# Patient Record
Sex: MALE | Race: WHITE | NOT HISPANIC OR LATINO | Employment: FULL TIME | URBAN - METROPOLITAN AREA
[De-identification: names, ages, dates, MRNs, and addresses within clinical notes are randomized per-mention and may not be internally consistent; named-entity substitution may affect disease eponyms.]

---

## 2019-08-15 ENCOUNTER — CLINICAL SUPPORT (OUTPATIENT)
Dept: URGENT CARE | Facility: CLINIC | Age: 59
End: 2019-08-15
Payer: COMMERCIAL

## 2019-08-15 ENCOUNTER — TRANSCRIBE ORDERS (OUTPATIENT)
Dept: ADMINISTRATIVE | Facility: HOSPITAL | Age: 59
End: 2019-08-15

## 2019-08-15 ENCOUNTER — APPOINTMENT (OUTPATIENT)
Dept: RADIOLOGY | Facility: CLINIC | Age: 59
End: 2019-08-15
Payer: COMMERCIAL

## 2019-08-15 ENCOUNTER — APPOINTMENT (OUTPATIENT)
Dept: LAB | Facility: CLINIC | Age: 59
End: 2019-08-15
Payer: COMMERCIAL

## 2019-08-15 ENCOUNTER — APPOINTMENT (OUTPATIENT)
Dept: URGENT CARE | Facility: CLINIC | Age: 59
End: 2019-08-15
Payer: COMMERCIAL

## 2019-08-15 DIAGNOSIS — Z00.00 ROUTINE MEDICAL EXAM: ICD-10-CM

## 2019-08-15 DIAGNOSIS — Z00.00 ROUTINE MEDICAL EXAM: Primary | ICD-10-CM

## 2019-08-15 LAB
25(OH)D3 SERPL-MCNC: 31.4 NG/ML (ref 30–100)
ALBUMIN SERPL BCP-MCNC: 4.3 G/DL (ref 3.5–5)
ALP SERPL-CCNC: 71 U/L (ref 46–116)
ALT SERPL W P-5'-P-CCNC: 30 U/L (ref 12–78)
ANION GAP SERPL CALCULATED.3IONS-SCNC: 6 MMOL/L (ref 4–13)
AST SERPL W P-5'-P-CCNC: 15 U/L (ref 5–45)
BILIRUB DIRECT SERPL-MCNC: 0.16 MG/DL (ref 0–0.2)
BILIRUB SERPL-MCNC: 0.69 MG/DL (ref 0.2–1)
BUN SERPL-MCNC: 12 MG/DL (ref 5–25)
CALCIUM SERPL-MCNC: 9.3 MG/DL (ref 8.3–10.1)
CHLORIDE SERPL-SCNC: 106 MMOL/L (ref 100–108)
CHOLEST SERPL-MCNC: 223 MG/DL (ref 50–200)
CO2 SERPL-SCNC: 26 MMOL/L (ref 21–32)
CREAT SERPL-MCNC: 0.92 MG/DL (ref 0.6–1.3)
ERYTHROCYTE [DISTWIDTH] IN BLOOD BY AUTOMATED COUNT: 13.6 % (ref 11.6–15.1)
GFR SERPL CREATININE-BSD FRML MDRD: 89 ML/MIN/1.73SQ M
GLUCOSE P FAST SERPL-MCNC: 91 MG/DL (ref 65–99)
HCT VFR BLD AUTO: 48.4 % (ref 36.5–49.3)
HDLC SERPL-MCNC: 51 MG/DL (ref 40–60)
HGB BLD-MCNC: 16.7 G/DL (ref 12–17)
LDLC SERPL CALC-MCNC: 150 MG/DL (ref 0–100)
MCH RBC QN AUTO: 31.2 PG (ref 26.8–34.3)
MCHC RBC AUTO-ENTMCNC: 34.5 G/DL (ref 31.4–37.4)
MCV RBC AUTO: 91 FL (ref 82–98)
NONHDLC SERPL-MCNC: 172 MG/DL
PLATELET # BLD AUTO: 268 THOUSANDS/UL (ref 149–390)
PMV BLD AUTO: 9 FL (ref 8.9–12.7)
POTASSIUM SERPL-SCNC: 3.8 MMOL/L (ref 3.5–5.3)
PROT SERPL-MCNC: 7.6 G/DL (ref 6.4–8.2)
RBC # BLD AUTO: 5.35 MILLION/UL (ref 3.88–5.62)
SODIUM SERPL-SCNC: 138 MMOL/L (ref 136–145)
TRIGL SERPL-MCNC: 111 MG/DL
WBC # BLD AUTO: 5.85 THOUSAND/UL (ref 4.31–10.16)

## 2019-08-15 PROCEDURE — 36415 COLL VENOUS BLD VENIPUNCTURE: CPT

## 2019-08-15 PROCEDURE — 80053 COMPREHEN METABOLIC PANEL: CPT

## 2019-08-15 PROCEDURE — 82306 VITAMIN D 25 HYDROXY: CPT

## 2019-08-15 PROCEDURE — 85027 COMPLETE CBC AUTOMATED: CPT

## 2019-08-15 PROCEDURE — 80061 LIPID PANEL: CPT

## 2019-08-15 PROCEDURE — 93005 ELECTROCARDIOGRAM TRACING: CPT

## 2019-08-15 PROCEDURE — 71046 X-RAY EXAM CHEST 2 VIEWS: CPT

## 2019-08-15 PROCEDURE — 82248 BILIRUBIN DIRECT: CPT

## 2019-08-20 LAB
ATRIAL RATE: 76 BPM
P AXIS: 30 DEGREES
PR INTERVAL: 154 MS
QRS AXIS: 23 DEGREES
QRSD INTERVAL: 80 MS
QT INTERVAL: 368 MS
QTC INTERVAL: 414 MS
T WAVE AXIS: 33 DEGREES
VENTRICULAR RATE: 76 BPM

## 2019-08-20 PROCEDURE — 93010 ELECTROCARDIOGRAM REPORT: CPT | Performed by: INTERNAL MEDICINE

## 2024-10-29 ENCOUNTER — OFFICE VISIT (OUTPATIENT)
Dept: URGENT CARE | Facility: CLINIC | Age: 64
End: 2024-10-29
Payer: COMMERCIAL

## 2024-10-29 VITALS
BODY MASS INDEX: 29.19 KG/M2 | TEMPERATURE: 97.8 F | DIASTOLIC BLOOD PRESSURE: 70 MMHG | HEART RATE: 82 BPM | OXYGEN SATURATION: 98 % | WEIGHT: 186 LBS | SYSTOLIC BLOOD PRESSURE: 109 MMHG | RESPIRATION RATE: 18 BRPM | HEIGHT: 67 IN

## 2024-10-29 DIAGNOSIS — M79.645 PAIN OF LEFT THUMB: Primary | ICD-10-CM

## 2024-10-29 PROBLEM — E78.5 HYPERLIPIDEMIA, UNSPECIFIED: Status: ACTIVE | Noted: 2024-10-29

## 2024-10-29 PROBLEM — J30.9 ALLERGIC RHINITIS: Status: ACTIVE | Noted: 2024-10-29

## 2024-10-29 PROBLEM — E53.8 COBALAMIN DEFICIENCY: Status: ACTIVE | Noted: 2024-10-29

## 2024-10-29 PROBLEM — F41.9 ANXIETY: Status: ACTIVE | Noted: 2024-10-29

## 2024-10-29 PROBLEM — R35.1 NOCTURIA ASSOCIATED WITH BENIGN PROSTATIC HYPERPLASIA: Status: ACTIVE | Noted: 2024-10-29

## 2024-10-29 PROBLEM — N40.1 NOCTURIA ASSOCIATED WITH BENIGN PROSTATIC HYPERPLASIA: Status: ACTIVE | Noted: 2024-10-29

## 2024-10-29 PROCEDURE — 99204 OFFICE O/P NEW MOD 45 MIN: CPT | Performed by: NURSE PRACTITIONER

## 2024-10-29 RX ORDER — IBUPROFEN AND FAMOTIDINE 26.6; 8 MG/1; MG/1
1 TABLET ORAL 3 TIMES DAILY PRN
Qty: 90 TABLET | Refills: 0 | Status: SHIPPED | OUTPATIENT
Start: 2024-10-29 | End: 2024-10-29 | Stop reason: ALTCHOICE

## 2024-10-29 RX ORDER — PREDNISONE 10 MG/1
TABLET ORAL
Qty: 30 TABLET | Refills: 0 | Status: SHIPPED | OUTPATIENT
Start: 2024-10-29

## 2024-10-29 RX ORDER — MELOXICAM 15 MG/1
15 TABLET ORAL DAILY
Qty: 30 TABLET | Refills: 0 | Status: SHIPPED | OUTPATIENT
Start: 2024-10-29

## 2024-10-29 RX ORDER — OMEPRAZOLE 40 MG/1
1 CAPSULE, DELAYED RELEASE ORAL DAILY
COMMUNITY
Start: 2024-10-19

## 2024-10-29 NOTE — PROGRESS NOTES
"  [facility]        NAME: Dee Eden is a 64 y.o. male  : 1960    MRN: [id number]  DATE: 2024  TIME: 11:44 AM      Assessment and Plan     Pain of left thumb [M79.645]  1. Pain of left thumb  Ambulatory Referral to Orthopedic Surgery    predniSONE 10 mg tablet    meloxicam (Mobic) 15 mg tablet    Ambulatory Referral to Family Practice    DISCONTINUED: Ibuprofen-Famotidine 800-26.6 MG TABS        Thumb splint offered explaining that immobilizing the thumb temporarily would help decrease inflammation.  Patient politely declines at this time.    Patient Instructions     Patient Instructions   Patient Education     Osteoarthritis   The Basics   Written by the doctors and editors at [employer]To[employer]   What is osteoarthritis? -- \"Arthritis\" is a general term that means inflammation of the joints. There are dozens of types of arthritis. Osteoarthritis is the most common type. It often begins later in life, and it often affects the hands, knees, and hips.  The place where 2 bones meet is normally covered with a rubbery material called cartilage. This material allows the bones to slide over each other without causing pain. When osteoarthritis happens, the cartilage begins to break down. As it wears away, the bones in the joint start to rub against each other (figure 1). This can cause pain, stiffness, and swelling (table 1).  What can I do to feel better? -- To ease your symptoms, you can:   Rest for several minutes when your pain is really bad - But don't rest too long. That can make your muscles weak and make your pain worse.   Try losing weight, if needed - Carrying excess weight puts extra strain on your joints. Your doctor or nurse can talk to you about healthy ways to lose weight.   Get plenty of physical activity - Having strong muscles takes some of the strain off of your joints. It can reduce your pain later, even if it hurts to do at first. There are lots of different ways to get physical " "activity. Even gentle forms of exercise, like walking, are good for your health. Your doctor or nurse can help you come up with a routine that works for you. They might also suggest that you work with a physical therapist (exercise expert).   Use \"assistive devices\" if your doctor recommends them - These devices can help keep your joints stable or take weight off of them. Examples include shoe inserts, splints, canes, and walkers.   Use hot or cold packs - Some people find that heat or cold helps relieves pain for a short time.   Learn about arthritis - Learning about your condition allows you to work with your doctor or nurse to find the things that help you best. It can also help you better understand what to expect with your symptoms and treatment.  Can herbs, vitamins, or supplements help? -- There is no strong evidence that supplements of any sort work on arthritis symptoms. That's true even for glucosamine and chondroitin, which are supplements that some people think help with arthritis. If you want to try any supplements or herbs, check with your doctor or nurse before taking them.  Are there medicines I can take? -- Usually, doctors suggest trying things like physical activity, weight loss, and assistive devices first. If these do not help with pain, they might recommend medicine. Options include medicines that come as pills as well as creams and gels that go on the skin.  In some situations, doctors might suggest shots that go into the joint to relieve pain. But these are not usually used as a main treatment, because they only work for a few weeks.  What about surgery? -- When other treatments do not help enough, some people with osteoarthritis get surgery. For instance, some people have surgery to replace a knee or a hip. Surgeons are working on other types of surgery for arthritis, too.  How can I find an approach that works for me? -- The symptoms of osteoarthritis can be hard to cope with. But don't " "lose hope. You might need to try different combinations of medicines, exercises, and devices to find the approach that works for you. But most people are able to find ways to go back to doing many of the things that they like to do.  All topics are updated as new evidence becomes available and our peer review process is complete.  This topic retrieved from [employer] on: Feb 26, 2024.  Topic 99087 Version 21.0  Release: 32.2.4 - C32.56  © 2024 UpToDate, Inc. and/or its affiliates. All rights reserved.  figure 1: Knee osteoarthritis     This drawing shows a normal knee joint next to a knee joint with osteoarthritis. In the osteoarthritis joint, the cartilage covering the ends of the bones roughens and becomes thin, while the bone underneath the cartilage grows thicker. Bony growths called \"osteophytes\" can form. The space between the bones also becomes narrower.  Graphic 267612 Version 3.0  table 1: Effects of osteoarthritis  Age when symptoms start    Usually after 40   Commonly affected body parts    Neck and lower back   Joint at the base of the thumb   Knuckles in the middle and near the tip of the fingers   Hip   Knee   Ankle joint   Knuckle at the base of the big toe   Less commonly affected body parts    Shoulder   Wrist   Elbow   Knuckles at the base of the fingers   Symptoms    Pain   Stiffness   Crackling or clicking sounds in the joints   Extra bone growth (for example, knuckles that look swollen or knobby)   Decreased range of motion   Problems with the alignment of certain joints   Tenderness to the touch   Graphic 41598 Version 2.0  Consumer Information Use and Disclaimer   Disclaimer: This generalized information is a limited summary of diagnosis, treatment, and/or medication information. It is not meant to be comprehensive and should be used as a tool to help the user understand and/or assess potential diagnostic and treatment options. It does NOT include all information about conditions, treatments, " medications, side effects, or risks that may apply to a specific patient. It is not intended to be medical advice or a substitute for the medical advice, diagnosis, or treatment of a health care provider based on the health care provider's examination and assessment of a patient's specific and unique circumstances. Patients must speak with a health care provider for complete information about their health, medical questions, and treatment options, including any risks or benefits regarding use of medications. This information does not endorse any treatments or medications as safe, effective, or approved for treating a specific patient. UpToDate, Inc. and its affiliates disclaim any warranty or liability relating to this information or the use thereof.The use of this information is governed by the Terms of Use, available at https://www.[employer]/en/know/clinical-effectiveness-terms. 2024© UpToDate, Inc. and its affiliates and/or licensors. All rights reserved.  Copyright   © 2024 UpToDate, Inc. and/or its affiliates. All rights reserved.     Follow up with PCP in 3-5 days.  Proceed to  ER if symptoms worsen.    Chief Complaint     Chief Complaint   Patient presents with    Wrist Pain     L wrist pain x 3 years, more acute x 2 weeks. Previous treatment by PCP for arthritis.         History of Present Illness     Patient presents with acute on chronic pain of left hand/wrist.  He isolates the pain to the base of his left thumb but notes that will radiate up his arm at times.  At times the thumb will go numb.  He is predominantly right hand dominant.  No specific injury.  Denies any increased use.  He was seen previously for this issue several visits between 2020 and 2022 at [facility].  Initially he was prescribed Voltaren gel several times which helped slightly but not much.  He was then prescribed ibuprofen 800 3 times daily as needed along with Pepcid 20 twice daily.  He remembers this helping some but again  not significantly.  He was prescribed Mobic 15 daily.  He notes that he only took this occasionally not frequently.  We did discuss that anti-inflammatories have a cumulative effect when they are used consistently for 3 to 7 days.  Ultimately on September 27, 2022 he received lido with Depo-Medrol injection into the joint which resolved the acute symptoms (until now).        Review of Systems     Review of Systems   Musculoskeletal:  Positive for arthralgias and joint swelling.   All other systems reviewed and are negative.        Current Medications       Current Outpatient Medications:     azelastine (ASTELIN) 0.1 % nasal spray, 1 spray into each nostril 2 (two) times a day, Disp: 30 mL, Rfl: 11    ciprofloxacin-dexamethasone (CIPRODEX) otic suspension, INSTILL 4 DROPS INTO THE RIGHT EAR TWICE A DAY, Disp: 7.5 mL, Rfl: 5    meloxicam (Mobic) 15 mg tablet, Take 1 tablet (15 mg total) by mouth daily, Disp: 30 tablet, Rfl: 0    omeprazole (PriLOSEC) 40 MG capsule, Take 1 capsule by mouth in the morning, Disp: , Rfl:     Pancrelipase, Lip-Prot-Amyl, (CREON PO), Take 6,000 Units by mouth 3 (three) times a day, Disp: , Rfl:     predniSONE 10 mg tablet, 5 tabs daily x 2 days, 4 tabs daily x 2 days, 3 tabs daily x 2 days, 2 tabs daily x 2 days, 1 tab daily x 2 days, Disp: 30 tablet, Rfl: 0    Diclofenac Sodium (VOLTAREN) 1 %, Apply 4 g topically 4 (four) times a day as needed (pain), Disp: , Rfl:     Current Allergies     Allergies as of 10/29/2024    (No Known Allergies)              The following portions of the patient's history were reviewed and updated as appropriate: allergies, current medications, past family history, past medical history, past social history, past surgical history and problem list.     Past Medical History:   Diagnosis Date    Chronic mastoiditis, right     ETD (eustachian tube dysfunction)        Past Surgical History:   Procedure Laterality Date    MASTOID SURGERY Right     TONSILLECTOMY    "      Family History   Problem Relation Age of Onset    No Known Problems Mother     No Known Problems Father          Medications have been verified.        Objective     /70 (BP Location: Left arm, Patient Position: Sitting, Cuff Size: Large)   Pulse 82   Temp 97.8 °F (36.6 °C) (Temporal)   Resp 18   Ht 5' 7\" (1.702 m)   Wt 84.4 kg (186 lb)   SpO2 98%   BMI 29.13 kg/m²   No LMP for male patient.         Physical Exam     Physical Exam  Vitals and nursing note reviewed.   Constitutional:       General: He is not in acute distress.     Appearance: Normal appearance. He is well-developed. He is not ill-appearing, toxic-appearing or diaphoretic.   HENT:      Head: Normocephalic and atraumatic.   Pulmonary:      Effort: Pulmonary effort is normal. No respiratory distress.   Abdominal:      General: There is no distension.   Musculoskeletal:         General: Swelling and tenderness present. No deformity or signs of injury. Normal range of motion.      Left wrist: Normal. No tenderness.      Left hand: Swelling, tenderness and bony tenderness present. No deformity or lacerations. Normal range of motion. Normal strength. Normal sensation (not at present time although he will have intermittent numbness). There is no disruption of two-point discrimination. Normal capillary refill. Normal pulse.      Comments: Point tenderness at 1st MCP left hand with mild localized swelling, no erythema or open wound   Skin:     General: Skin is warm and dry.      Capillary Refill: Capillary refill takes less than 2 seconds.   Neurological:      General: No focal deficit present.      Mental Status: He is alert and oriented to person, place, and time.   Psychiatric:         Mood and Affect: Mood normal.         Behavior: Behavior normal.         Thought Content: Thought content normal.         Judgment: Judgment normal.       "

## 2024-10-29 NOTE — PATIENT INSTRUCTIONS
"Patient Education     Osteoarthritis   The Basics   Written by the doctors and editors at [facility]   What is osteoarthritis? -- \"Arthritis\" is a general term that means inflammation of the joints. There are dozens of types of arthritis. Osteoarthritis is the most common type. It often begins later in life, and it often affects the hands, knees, and hips.  The place where 2 bones meet is normally covered with a rubbery material called cartilage. This material allows the bones to slide over each other without causing pain. When osteoarthritis happens, the cartilage begins to break down. As it wears away, the bones in the joint start to rub against each other (figure 1). This can cause pain, stiffness, and swelling (table 1).  What can I do to feel better? -- To ease your symptoms, you can:   Rest for several minutes when your pain is really bad - But don't rest too long. That can make your muscles weak and make your pain worse.   Try losing weight, if needed - Carrying excess weight puts extra strain on your joints. Your doctor or nurse can talk to you about healthy ways to lose weight.   Get plenty of physical activity - Having strong muscles takes some of the strain off of your joints. It can reduce your pain later, even if it hurts to do at first. There are lots of different ways to get physical activity. Even gentle forms of exercise, like walking, are good for your health. Your doctor or nurse can help you come up with a routine that works for you. They might also suggest that you work with a physical therapist (exercise expert).   Use \"assistive devices\" if your doctor recommends them - These devices can help keep your joints stable or take weight off of them. Examples include shoe inserts, splints, canes, and walkers.   Use hot or cold packs - Some people find that heat or cold helps relieves pain for a short time.   Learn about arthritis - Learning about your condition allows you to work with your doctor or " nurse to find the things that help you best. It can also help you better understand what to expect with your symptoms and treatment.  Can herbs, vitamins, or supplements help? -- There is no strong evidence that supplements of any sort work on arthritis symptoms. That's true even for glucosamine and chondroitin, which are supplements that some people think help with arthritis. If you want to try any supplements or herbs, check with your doctor or nurse before taking them.  Are there medicines I can take? -- Usually, doctors suggest trying things like physical activity, weight loss, and assistive devices first. If these do not help with pain, they might recommend medicine. Options include medicines that come as pills as well as creams and gels that go on the skin.  In some situations, doctors might suggest shots that go into the joint to relieve pain. But these are not usually used as a main treatment, because they only work for a few weeks.  What about surgery? -- When other treatments do not help enough, some people with osteoarthritis get surgery. For instance, some people have surgery to replace a knee or a hip. Surgeons are working on other types of surgery for arthritis, too.  How can I find an approach that works for me? -- The symptoms of osteoarthritis can be hard to cope with. But don't lose hope. You might need to try different combinations of medicines, exercises, and devices to find the approach that works for you. But most people are able to find ways to go back to doing many of the things that they like to do.  All topics are updated as new evidence becomes available and our peer review process is complete.  This topic retrieved from [employer] on: Feb 26, 2024.  Topic 26426 Version 21.0  Release: 32.2.4 - C32.56  © 2024 UpToDate, Inc. and/or its affiliates. All rights reserved.  figure 1: Knee osteoarthritis     This drawing shows a normal knee joint next to a knee joint with osteoarthritis. In the  "osteoarthritis joint, the cartilage covering the ends of the bones roughens and becomes thin, while the bone underneath the cartilage grows thicker. Bony growths called \"osteophytes\" can form. The space between the bones also becomes narrower.  Graphic 429089 Version 3.0  table 1: Effects of osteoarthritis  Age when symptoms start    Usually after 40   Commonly affected body parts    Neck and lower back   Joint at the base of the thumb   Knuckles in the middle and near the tip of the fingers   Hip   Knee   Ankle joint   Knuckle at the base of the big toe   Less commonly affected body parts    Shoulder   Wrist   Elbow   Knuckles at the base of the fingers   Symptoms    Pain   Stiffness   Crackling or clicking sounds in the joints   Extra bone growth (for example, knuckles that look swollen or knobby)   Decreased range of motion   Problems with the alignment of certain joints   Tenderness to the touch   Graphic 35546 Version 2.0  Consumer Information Use and Disclaimer   Disclaimer: This generalized information is a limited summary of diagnosis, treatment, and/or medication information. It is not meant to be comprehensive and should be used as a tool to help the user understand and/or assess potential diagnostic and treatment options. It does NOT include all information about conditions, treatments, medications, side effects, or risks that may apply to a specific patient. It is not intended to be medical advice or a substitute for the medical advice, diagnosis, or treatment of a health care provider based on the health care provider's examination and assessment of a patient's specific and unique circumstances. Patients must speak with a health care provider for complete information about their health, medical questions, and treatment options, including any risks or benefits regarding use of medications. This information does not endorse any treatments or medications as safe, effective, or approved for treating a " specific patient. UpToDate, Inc. and its affiliates disclaim any warranty or liability relating to this information or the use thereof.The use of this information is governed by the Terms of Use, available at https://www.[employer].com/en/know/clinical-effectiveness-terms. 2024© UpToDate, Inc. and its affiliates and/or licensors. All rights reserved.  Copyright   © 2024 UpToDate, Inc. and/or its affiliates. All rights reserved.

## 2024-11-01 ENCOUNTER — TELEPHONE (OUTPATIENT)
Age: 64
End: 2024-11-01

## 2024-11-01 NOTE — TELEPHONE ENCOUNTER
Patient is being referred to a orthopedics. Please schedule accordingly.    [facility]   [phone]

## 2025-08-22 ENCOUNTER — OFFICE VISIT (OUTPATIENT)
Dept: OBGYN CLINIC | Facility: CLINIC | Age: 65
End: 2025-08-22
Payer: COMMERCIAL

## 2025-08-22 VITALS — HEIGHT: 67 IN | BODY MASS INDEX: 29.82 KG/M2 | WEIGHT: 190 LBS

## 2025-08-22 DIAGNOSIS — M18.12 ARTHRITIS OF CARPOMETACARPAL (CMC) JOINT OF LEFT THUMB: Primary | ICD-10-CM

## 2025-08-22 PROCEDURE — 99213 OFFICE O/P EST LOW 20 MIN: CPT | Performed by: ORTHOPAEDIC SURGERY

## 2025-08-22 RX ORDER — MELOXICAM 15 MG/1
15 TABLET ORAL DAILY
Qty: 90 TABLET | Refills: 1 | Status: SHIPPED | OUTPATIENT
Start: 2025-08-22